# Patient Record
Sex: FEMALE | Race: WHITE | NOT HISPANIC OR LATINO | Employment: FULL TIME
[De-identification: names, ages, dates, MRNs, and addresses within clinical notes are randomized per-mention and may not be internally consistent; named-entity substitution may affect disease eponyms.]

---

## 2021-12-12 ENCOUNTER — HEALTH MAINTENANCE LETTER (OUTPATIENT)
Age: 26
End: 2021-12-12

## 2021-12-13 ASSESSMENT — ANXIETY QUESTIONNAIRES
2. NOT BEING ABLE TO STOP OR CONTROL WORRYING: NOT AT ALL
7. FEELING AFRAID AS IF SOMETHING AWFUL MIGHT HAPPEN: NOT AT ALL
3. WORRYING TOO MUCH ABOUT DIFFERENT THINGS: NOT AT ALL
1. FEELING NERVOUS, ANXIOUS, OR ON EDGE: SEVERAL DAYS
5. BEING SO RESTLESS THAT IT IS HARD TO SIT STILL: SEVERAL DAYS
7. FEELING AFRAID AS IF SOMETHING AWFUL MIGHT HAPPEN: NOT AT ALL
4. TROUBLE RELAXING: NOT AT ALL
6. BECOMING EASILY ANNOYED OR IRRITABLE: NOT AT ALL
GAD7 TOTAL SCORE: 2
GAD7 TOTAL SCORE: 2

## 2021-12-14 ENCOUNTER — OFFICE VISIT (OUTPATIENT)
Dept: OBGYN | Facility: CLINIC | Age: 26
End: 2021-12-14
Attending: MIDWIFE
Payer: COMMERCIAL

## 2021-12-14 VITALS
SYSTOLIC BLOOD PRESSURE: 121 MMHG | BODY MASS INDEX: 27.05 KG/M2 | HEART RATE: 72 BPM | WEIGHT: 178.5 LBS | DIASTOLIC BLOOD PRESSURE: 72 MMHG | HEIGHT: 68 IN

## 2021-12-14 DIAGNOSIS — Z12.4 SCREENING FOR MALIGNANT NEOPLASM OF CERVIX: ICD-10-CM

## 2021-12-14 DIAGNOSIS — Z00.00 VISIT FOR PREVENTIVE HEALTH EXAMINATION: Primary | ICD-10-CM

## 2021-12-14 DIAGNOSIS — Z01.419 ENCOUNTER FOR GYNECOLOGICAL EXAMINATION WITHOUT ABNORMAL FINDING: ICD-10-CM

## 2021-12-14 PROCEDURE — G0463 HOSPITAL OUTPT CLINIC VISIT: HCPCS | Mod: 25

## 2021-12-14 PROCEDURE — G0145 SCR C/V CYTO,THINLAYER,RESCR: HCPCS | Performed by: MIDWIFE

## 2021-12-14 PROCEDURE — G0008 ADMIN INFLUENZA VIRUS VAC: HCPCS

## 2021-12-14 PROCEDURE — 250N000011 HC RX IP 250 OP 636

## 2021-12-14 PROCEDURE — 99385 PREV VISIT NEW AGE 18-39: CPT | Performed by: MIDWIFE

## 2021-12-14 PROCEDURE — 90686 IIV4 VACC NO PRSV 0.5 ML IM: CPT

## 2021-12-14 RX ORDER — SPIRONOLACTONE 100 MG/1
TABLET, FILM COATED ORAL
COMMUNITY
Start: 2021-06-04

## 2021-12-14 RX ORDER — NORGESTIMATE AND ETHINYL ESTRADIOL 7DAYSX3 LO
1 KIT ORAL DAILY
Qty: 90 TABLET | Refills: 3 | Status: SHIPPED | OUTPATIENT
Start: 2021-12-14 | End: 2023-02-07

## 2021-12-14 ASSESSMENT — ANXIETY QUESTIONNAIRES
5. BEING SO RESTLESS THAT IT IS HARD TO SIT STILL: SEVERAL DAYS
3. WORRYING TOO MUCH ABOUT DIFFERENT THINGS: NOT AT ALL
6. BECOMING EASILY ANNOYED OR IRRITABLE: NOT AT ALL
2. NOT BEING ABLE TO STOP OR CONTROL WORRYING: NOT AT ALL
1. FEELING NERVOUS, ANXIOUS, OR ON EDGE: NOT AT ALL
GAD7 TOTAL SCORE: 1
7. FEELING AFRAID AS IF SOMETHING AWFUL MIGHT HAPPEN: NOT AT ALL

## 2021-12-14 ASSESSMENT — MIFFLIN-ST. JEOR: SCORE: 1603.17

## 2021-12-14 ASSESSMENT — PATIENT HEALTH QUESTIONNAIRE - PHQ9
SUM OF ALL RESPONSES TO PHQ QUESTIONS 1-9: 1
5. POOR APPETITE OR OVEREATING: NOT AT ALL

## 2021-12-14 NOTE — PATIENT INSTRUCTIONS

## 2021-12-14 NOTE — PROGRESS NOTES
Progress Note    SUBJECTIVE:  Terri Haines is an 26 year old, No obstetric history on file., who requests an Annual Preventive Exam.     Concerns today include: here for annual, refill OCP and pap screening    Family in [place]   Has lived in [place] x 6 yrs for school and now working,in [place] good support network here   Enjoys rock climbing, biking   Is covid Moderna vaccinated with booster.  Would like flu shot today      Menstrual History:  Menstrual History 12/14/2021 12/14/2021   LAST MENSTRUAL PERIOD 12/6/2021 -   Period Cycle (Days) - 28   Period Duration (Days) - 4-5   Method of Contraception - None   Period Pattern - Regular   Menstrual Flow - Moderate   Dysmenorrhea - Mild   PMS Symptoms - Cramping   Reviewed Today - Yes       Last  No results found for: PAP  History of abnormal Pap smear: NO - age 21-29 PAP every 3 years recommended    Last No results found for: HPV16  Last No results found for: HPV18  Last No results found for: HRHPV    Mammogram current: not applicable  Last Mammogram:   No results found.     Last Colonoscopy:  No results found for this or any previous visit.      HISTORY:  spironolactone (ALDACTONE) 100 MG tablet,     No current facility-administered medications on file prior to visit.    No Known Allergies  Immunization History   Administered Date(s) Administered     COVID-19,PF,Moderna 04/05/2021, 05/05/2021, 11/11/2021       OB History   No obstetric history on file.     History reviewed. No pertinent past medical history.  History reviewed. No pertinent surgical history.  History reviewed. No pertinent family history.  Social History     Socioeconomic History     Marital status: Single     Spouse name: None     Number of children: None     Years of education: None     Highest education level: None   Occupational History     None   Tobacco Use     Smoking status: Never Smoker     Smokeless tobacco: Never Used     Tobacco comment: n/a   Substance and Sexual Activity     Alcohol  "use: Yes     Comment: sometimes 1-2 drinks in a week, but usually less     Drug use: Not Currently     Types: Marijuana     Comment: I would occasionally smoke recreationally in college     Sexual activity: Not Currently     Partners: Male     Birth control/protection: None   Other Topics Concern     None   Social History Narrative    How much exercise per week? 3-4 times    How much calcium per day? Through foods       How much caffeine per day? One cup coffee    How much vitamin D per day? Through foods    Do you/your family wear seatbelts?  Yes    Do you/your family use safety helmets? Yes    Do you/your family use sunscreen? Yes    Do you/your family keep firearms in the home? No    Do you/your family have a smoke detector(s)? Yes        Reviewed by Shanthi Pickens 12-14-21         Social Determinants of Health     Financial Resource Strain: Not on file   Food Insecurity: Not on file   Transportation Needs: Not on file   Physical Activity: Not on file   Stress: Not on file   Social Connections: Not on file   Intimate Partner Violence: Not At Risk     Fear of Current or Ex-Partner: No     Emotionally Abused: No     Physically Abused: No     Sexually Abused: No   Housing Stability: Not on file       ROS  [unfilled]  PHQ-9 SCORE 12/14/2021   PHQ-9 Total Score 1     TONNY-7 SCORE 12/13/2021 12/14/2021   Total Score 2 (minimal anxiety) -   Total Score 2 1         EXAM:  Blood pressure 121/72, pulse 72, height 1.727 m (5' 8\"), weight 81 kg (178 lb 8 oz), last menstrual period 12/06/2021. Body mass index is 27.14 kg/m .  General - pleasant female in no acute distress.  Skin - no suspicious lesions or rashes  EENT-  PERRLA, euthyroid with out palpable nodules  Neck - supple without lymphadenopathy.  Lungs - clear to auscultation bilaterally.  Heart - regular rate and rhythm without murmur.  Abdomen - soft, nontender, nondistended, no masses or organomegaly noted.  Musculoskeletal - no gross deformities.  Neurological - " normal strength, sensation, and mental status.    Breast Exam:  Breast: Without visible skin changes. No dimpling or lesions seen.   Breasts supple, non-tender with palpation, no dominant mass, nodularity, or nipple discharge noted bilaterally. Axillary nodes negative.      Pelvic Exam:  EG/BUS: Normal genital architecture without lesions, erythema or abnormal secretions Bartholin's, Urethra, We[place]'s normal   Urethral meatus: normal   Urethra: no masses, tenderness, or scarring   Bladder: no masses or tenderness   Vagina: moist, pink, rugae with creamy, white and odorless  secretions  Cervix: Nulliparous,, no lesions and pink, moist, closed, without lesion or CMT  Uterus: anteverted,  and small, smooth, firm, mobile w/o pain  Adnexa: Within normal limits and No masses, nodularity, tenderness  Rectum:anus normal       ASSESSMENT:     (Z00.00) Visit for preventive health examination  (primary encounter diagnosis)  Plan: norgestim-eth estrad triphasic (ORTHO         TRI-CYCLEN LO) 0.18/0.215/0.25 MG-25 MCG         tablet, Pap Smear Exam [] Do Not Remove,         Pelvic and Breast Exam Procedure [], Pap         imaged thin layer screen reflex to HPV if ASCUS        - recommended age 25 - 29 years            (Z12.4) Screening for malignant neoplasm of cervix    Plan: Pap Smear Exam [] Do Not Remove, Pelvic         and Breast Exam Procedure [], Pap imaged         thin layer screen reflex to HPV if ASCUS -         recommended age 25 - 29 years           (Z01.419) Encounter for gynecological examination without abnormal finding  Plan: Pap Smear Exam [] Do Not Remove, Pelvic         and Breast Exam Procedure []          REFILL of OCP sent to her pharmact     Orders Placed This Encounter   Medications     spironolactone (ALDACTONE) 100 MG tablet       Additional teaching done at this visit regarding self breast exam.    Return to clinic in one year.  Follow-up as needed.        Answers for  HPI/ROS submitted by the patient on 12/13/2021  TONNY 7 TOTAL SCORE: 2  Alycia Reinisch CNM APRN

## 2021-12-14 NOTE — LETTER
12/14/2021       RE: Terri Haines  [address]  [facility] [address]     Dear Colleague,    Thank you for referring your patient, Terri Haines, to the [facility] [place] [facility]. Please see a copy of my visit note below.      Progress Note    SUBJECTIVE:  Terri Haines is an 26 year old, No obstetric history on file., who requests an Annual Preventive Exam.     Concerns today include: here for annual, refill OCP and pap screening    Family in [place]   Has lived in [place] 6 yrs for school and now working,in [place] good support network here   Enjoys rock climbing, biking   Is covid Moderna vaccinated with booster.  Would like flu shot today      Menstrual History:  Menstrual History 12/14/2021 12/14/2021   LAST MENSTRUAL PERIOD 12/6/2021 -   Period Cycle (Days) - 28   Period Duration (Days) - 4-5   Method of Contraception - None   Period Pattern - Regular   Menstrual Flow - Moderate   Dysmenorrhea - Mild   PMS Symptoms - Cramping   Reviewed Today - Yes       Last  No results found for: PAP  History of abnormal Pap smear: NO - age 21-29 PAP every 3 years recommended    Last No results found for: HPV16  Last No results found for: HPV18  Last No results found for: HRHPV    Mammogram current: not applicable  Last Mammogram:   No results found.     Last Colonoscopy:  No results found for this or any previous visit.      HISTORY:  spironolactone (ALDACTONE) 100 MG tablet,     No current facility-administered medications on file prior to visit.    No Known Allergies  Immunization History   Administered Date(s) Administered     COVID-19,PF,Moderna 04/05/2021, 05/05/2021, 11/11/2021       OB History   No obstetric history on file.     History reviewed. No pertinent past medical history.  History reviewed. No pertinent surgical history.  History reviewed. No pertinent family history.  Social History     Socioeconomic History      "Marital status: Single     Spouse name: None     Number of children: None     Years of education: None     Highest education level: None   Occupational History     None   Tobacco Use     Smoking status: Never Smoker     Smokeless tobacco: Never Used     Tobacco comment: n/a   Substance and Sexual Activity     Alcohol use: Yes     Comment: sometimes 1-2 drinks in a week, but usually less     Drug use: Not Currently     Types: Marijuana     Comment: I would occasionally smoke recreationally in college     Sexual activity: Not Currently     Partners: Male     Birth control/protection: None   Other Topics Concern     None   Social History Narrative    How much exercise per week? 3-4 times    How much calcium per day? Through foods       How much caffeine per day? One cup coffee    How much vitamin D per day? Through foods    Do you/your family wear seatbelts?  Yes    Do you/your family use safety helmets? Yes    Do you/your family use sunscreen? Yes    Do you/your family keep firearms in the home? No    Do you/your family have a smoke detector(s)? Yes        Reviewed by Shanthi Pickens 12-14-21         Social Determinants of Health     Financial Resource Strain: Not on file   Food Insecurity: Not on file   Transportation Needs: Not on file   Physical Activity: Not on file   Stress: Not on file   Social Connections: Not on file   Intimate Partner Violence: Not At Risk     Fear of Current or Ex-Partner: No     Emotionally Abused: No     Physically Abused: No     Sexually Abused: No   Housing Stability: Not on file       ROS  [unfilled]  PHQ-9 SCORE 12/14/2021   PHQ-9 Total Score 1     TONNY-7 SCORE 12/13/2021 12/14/2021   Total Score 2 (minimal anxiety) -   Total Score 2 1         EXAM:  Blood pressure 121/72, pulse 72, height 1.727 m (5' 8\"), weight 81 kg (178 lb 8 oz), last menstrual period 12/06/2021. Body mass index is 27.14 kg/m .  General - pleasant female in no acute distress.  Skin - no suspicious lesions or " rashes  EENT-  PERRLA, euthyroid with out palpable nodules  Neck - supple without lymphadenopathy.  Lungs - clear to auscultation bilaterally.  Heart - regular rate and rhythm without murmur.  Abdomen - soft, nontender, nondistended, no masses or organomegaly noted.  Musculoskeletal - no gross deformities.  Neurological - normal strength, sensation, and mental status.    Breast Exam:  Breast: Without visible skin changes. No dimpling or lesions seen.   Breasts supple, non-tender with palpation, no dominant mass, nodularity, or nipple discharge noted bilaterally. Axillary nodes negative.      Pelvic Exam:  EG/BUS: Normal genital architecture without lesions, erythema or abnormal secretions Bartholin's, Urethra, Delton's normal   Urethral meatus: normal   Urethra: no masses, tenderness, or scarring   Bladder: no masses or tenderness   Vagina: moist, pink, rugae with creamy, white and odorless  secretions  Cervix: Nulliparous,, no lesions and pink, moist, closed, without lesion or CMT  Uterus: anteverted,  and small, smooth, firm, mobile w/o pain  Adnexa: Within normal limits and No masses, nodularity, tenderness  Rectum:anus normal       ASSESSMENT:     (Z00.00) Visit for preventive health examination  (primary encounter diagnosis)  Plan: norgestim-eth estrad triphasic (ORTHO         TRI-CYCLEN LO) 0.18/0.215/0.25 MG-25 MCG         tablet, Pap Smear Exam [] Do Not Remove,         Pelvic and Breast Exam Procedure [], Pap         imaged thin layer screen reflex to HPV if ASCUS        - recommended age 25 - 29 years            (Z12.4) Screening for malignant neoplasm of cervix    Plan: Pap Smear Exam [] Do Not Remove, Pelvic         and Breast Exam Procedure [], Pap imaged         thin layer screen reflex to HPV if ASCUS -         recommended age 25 - 29 years           (Z01.419) Encounter for gynecological examination without abnormal finding  Plan: Pap Smear Exam [] Do Not Remove, Pelvic          and Breast Exam Procedure []          REFILL of OCP sent to her pharmact     Orders Placed This Encounter   Medications     spironolactone (ALDACTONE) 100 MG tablet       Additional teaching done at this visit regarding self breast exam.    Return to clinic in one year.  Follow-up as needed.        Answers for HPI/ROS submitted by the patient on 12/13/2021  TONNY 7 TOTAL SCORE: 2  Alycia HUI           Again, thank you for allowing me to participate in the care of your patient.      Sincerely,    KORINA Del Rosario CNM

## 2021-12-14 NOTE — LETTER
Date:December 15, 2021      Patient was self referred, no letter generated. Do not send.        [facility] Health Information

## 2021-12-16 LAB
BKR LAB AP GYN ADEQUACY: NORMAL
BKR LAB AP GYN INTERPRETATION: NORMAL
BKR LAB AP HPV REFLEX: NORMAL
BKR LAB AP LMP: NORMAL
BKR LAB AP PREVIOUS ABNORMAL: NORMAL
PATH REPORT.COMMENTS IMP SPEC: NORMAL
PATH REPORT.COMMENTS IMP SPEC: NORMAL
PATH REPORT.RELEVANT HX SPEC: NORMAL

## 2022-10-03 ENCOUNTER — HEALTH MAINTENANCE LETTER (OUTPATIENT)
Age: 27
End: 2022-10-03

## 2023-02-06 ASSESSMENT — ENCOUNTER SYMPTOMS
CHILLS: 0
ARTHRALGIAS: 0
HEADACHES: 0
NAUSEA: 0
PARESTHESIAS: 0
BREAST MASS: 0
DYSURIA: 0
JOINT SWELLING: 0
SORE THROAT: 0
FEVER: 0
CONSTIPATION: 0
WEAKNESS: 0
ABDOMINAL PAIN: 0
PALPITATIONS: 0
HEMATURIA: 0
FREQUENCY: 0
SHORTNESS OF BREATH: 0
DIZZINESS: 0
DIARRHEA: 0
EYE PAIN: 0
NERVOUS/ANXIOUS: 0
COUGH: 1
HEARTBURN: 0
MYALGIAS: 0
HEMATOCHEZIA: 0

## 2023-02-07 ENCOUNTER — OFFICE VISIT (OUTPATIENT)
Dept: FAMILY MEDICINE | Facility: CLINIC | Age: 28
End: 2023-02-07
Payer: COMMERCIAL

## 2023-02-07 ENCOUNTER — APPOINTMENT (OUTPATIENT)
Dept: LAB | Facility: CLINIC | Age: 28
End: 2023-02-07
Payer: COMMERCIAL

## 2023-02-07 VITALS
SYSTOLIC BLOOD PRESSURE: 117 MMHG | TEMPERATURE: 96.4 F | DIASTOLIC BLOOD PRESSURE: 77 MMHG | OXYGEN SATURATION: 98 % | WEIGHT: 181.5 LBS | BODY MASS INDEX: 27.51 KG/M2 | HEIGHT: 68 IN | HEART RATE: 80 BPM | RESPIRATION RATE: 15 BRPM

## 2023-02-07 DIAGNOSIS — Z00.00 ROUTINE HISTORY AND PHYSICAL EXAMINATION OF ADULT: Primary | ICD-10-CM

## 2023-02-07 DIAGNOSIS — L70.8 OTHER ACNE: ICD-10-CM

## 2023-02-07 LAB
ALBUMIN SERPL BCG-MCNC: 4.5 G/DL (ref 3.5–5.2)
ALP SERPL-CCNC: 63 U/L (ref 35–104)
ALT SERPL W P-5'-P-CCNC: 25 U/L (ref 10–35)
ANION GAP SERPL CALCULATED.3IONS-SCNC: 11 MMOL/L (ref 7–15)
AST SERPL W P-5'-P-CCNC: 24 U/L (ref 10–35)
BILIRUB SERPL-MCNC: 0.2 MG/DL
BUN SERPL-MCNC: 10.1 MG/DL (ref 6–20)
CALCIUM SERPL-MCNC: 9.2 MG/DL (ref 8.6–10)
CHLORIDE SERPL-SCNC: 102 MMOL/L (ref 98–107)
CREAT SERPL-MCNC: 0.69 MG/DL (ref 0.51–0.95)
DEPRECATED HCO3 PLAS-SCNC: 25 MMOL/L (ref 22–29)
GFR SERPL CREATININE-BSD FRML MDRD: >90 ML/MIN/1.73M2
GLUCOSE SERPL-MCNC: 115 MG/DL (ref 70–99)
POTASSIUM SERPL-SCNC: 4 MMOL/L (ref 3.4–5.3)
PROT SERPL-MCNC: 7.4 G/DL (ref 6.4–8.3)
SODIUM SERPL-SCNC: 138 MMOL/L (ref 136–145)

## 2023-02-07 PROCEDURE — 90715 TDAP VACCINE 7 YRS/> IM: CPT | Performed by: NURSE PRACTITIONER

## 2023-02-07 PROCEDURE — 80053 COMPREHEN METABOLIC PANEL: CPT | Performed by: NURSE PRACTITIONER

## 2023-02-07 PROCEDURE — 99385 PREV VISIT NEW AGE 18-39: CPT | Mod: 25 | Performed by: NURSE PRACTITIONER

## 2023-02-07 PROCEDURE — 36415 COLL VENOUS BLD VENIPUNCTURE: CPT | Performed by: NURSE PRACTITIONER

## 2023-02-07 PROCEDURE — 90471 IMMUNIZATION ADMIN: CPT | Performed by: NURSE PRACTITIONER

## 2023-02-07 ASSESSMENT — ENCOUNTER SYMPTOMS
WEAKNESS: 0
EYE PAIN: 0
CHILLS: 0
CONSTIPATION: 0
ABDOMINAL PAIN: 0
FEVER: 0
MYALGIAS: 0
ARTHRALGIAS: 0
HEARTBURN: 0
NAUSEA: 0
SORE THROAT: 0
PALPITATIONS: 0
JOINT SWELLING: 0
FREQUENCY: 0
DIZZINESS: 0
DYSURIA: 0
BREAST MASS: 0
SHORTNESS OF BREATH: 0
NERVOUS/ANXIOUS: 0
DIARRHEA: 0
COUGH: 1
HEMATURIA: 0
PARESTHESIAS: 0
HEMATOCHEZIA: 0
HEADACHES: 0

## 2023-02-07 ASSESSMENT — PAIN SCALES - GENERAL: PAINLEVEL: NO PAIN (0)

## 2023-02-07 NOTE — PROGRESS NOTES
SUBJECTIVE:   CC: Terri is an 27 year old who presents for preventive health visit.   Patient has been advised of split billing requirements and indicates understanding: Yes  Healthy Habits:     Getting at least 3 servings of Calcium per day:  Yes    Bi-annual eye exam:  NO    Dental care twice a year:  Yes    Sleep apnea or symptoms of sleep apnea:  Daytime drowsiness    Diet:  Regular (no restrictions)    Frequency of exercise:  2-3 days/week    Taking medications regularly:  Yes    Medication side effects:  Not applicable    PHQ-2 Total Score: 2    Additional concerns today:  No  Has been on spironolactone for acne-works well. Sees a dermatologist.  Mood has been stable; deals with some seasonal mood changes, but manageable with taking a daily walk.  Originally from [place], came to [place] for College at [employer].      Today's PHQ-2 Score:   PHQ-2 ( 1999 Pfizer) 2/6/2023   Q1: Little interest or pleasure in doing things 1   Q2: Feeling down, depressed or hopeless 1   PHQ-2 Score 2   Q1: Little interest or pleasure in doing things Several days   Q2: Feeling down, depressed or hopeless Several days   PHQ-2 Score 2       Have you ever done Advance Care Planning? (For example, a Health Directive, POLST, or a discussion with a medical provider or your loved ones about your wishes): No, advance care planning information given to patient to review.  Patient plans to discuss their wishes with loved ones or provider.      Social History     Tobacco Use     Smoking status: Never     Smokeless tobacco: Never     Tobacco comments:     n/a   Substance Use Topics     Alcohol use: Yes     Comment: sometimes 1-2 drinks in a week, but usually less     If you drink alcohol do you typically have >3 drinks per day or >7 drinks per week? No    No flowsheet data found.    Reviewed orders with patient.  Reviewed health maintenance and updated orders accordingly - Yes  Lab work is in process  Labs reviewed in EPIC    Breast  "Cancer Screening:    Breast CA Risk Assessment (FHS-7) 2/6/2023   Do you have a family history of breast, colon, or ovarian cancer? No / Unknown       click delete button to remove this line now  Patient under 40 years of age: Routine Mammogram Screening not recommended.   Pertinent mammograms are reviewed under the imaging tab.    History of abnormal Pap smear: NO - age 21-29 PAP every 3 years recommended  PAP / HPV Latest Ref Rng & Units 12/14/2021   PAP   Negative for Intraepithelial Lesion or Malignancy (NILM)     Reviewed and updated as needed this visit by clinical staff   Tobacco  Allergies  Meds   Med Hx  Surg Hx  Fam Hx  Soc Hx        Reviewed and updated as needed this visit by Provider   Tobacco     Med Hx  Surg Hx  Fam Hx  Soc Hx           Review of Systems   Constitutional: Negative for chills and fever.   HENT: Positive for congestion. Negative for ear pain, hearing loss and sore throat.    Eyes: Negative for pain and visual disturbance.   Respiratory: Positive for cough. Negative for shortness of breath.    Cardiovascular: Negative for chest pain, palpitations and peripheral edema.   Gastrointestinal: Negative for abdominal pain, constipation, diarrhea, heartburn, hematochezia and nausea.   Breasts:  Negative for tenderness, breast mass and discharge.   Genitourinary: Negative for dysuria, frequency, genital sores, hematuria, pelvic pain, urgency, vaginal bleeding and vaginal discharge.   Musculoskeletal: Negative for arthralgias, joint swelling and myalgias.   Skin: Negative for rash.   Neurological: Negative for dizziness, weakness, headaches and paresthesias.   Psychiatric/Behavioral: Negative for mood changes. The patient is not nervous/anxious.           OBJECTIVE:   /77 (BP Location: Right arm, Patient Position: Sitting, Cuff Size: Adult Regular)   Pulse 80   Temp (!) 96.4  F (35.8  C) (Temporal)   Resp 15   Ht 1.724 m (5' 7.87\")   Wt 82.3 kg (181 lb 8 oz)   LMP 01/10/2023 " "(Approximate)   SpO2 98%   BMI 27.70 kg/m    Physical Exam  GENERAL: healthy, alert and no distress  EYES: Eyes grossly normal to inspection, PERRL and conjunctivae and sclerae normal  NECK: no adenopathy, no asymmetry, masses, or scars and thyroid normal to palpation  RESP: lungs clear to auscultation - no rales, rhonchi or wheezes  CV: regular rate and rhythm, normal S1 S2, no S3 or S4, no murmur, click or rub, no peripheral edema and peripheral pulses strong  ABDOMEN: soft, nontender, no hepatosplenomegaly, no masses and bowel sounds normal  MS: no gross musculoskeletal defects noted, no edema  SKIN: no suspicious lesions or rashes    Diagnostic Test Results:  Labs reviewed in Epic    ASSESSMENT/PLAN:       ICD-10-CM    1. Routine history and physical examination of adult  Z00.00       2. Other acne  L70.8       stable; no concerns  Acne managed by dermatology        COUNSELING:  Reviewed preventive health counseling, as reflected in patient instructions       Regular exercise       Healthy diet/nutrition      BMI:   Estimated body mass index is 27.7 kg/m  as calculated from the following:    Height as of this encounter: 1.724 m (5' 7.87\").    Weight as of this encounter: 82.3 kg (181 lb 8 oz).         She reports that she has never smoked. She has never used smokeless tobacco.      KORINA Bentley CNP  [facility]  "

## 2023-09-11 ENCOUNTER — OFFICE VISIT (OUTPATIENT)
Dept: URGENT CARE | Facility: URGENT CARE | Age: 28
End: 2023-09-11
Payer: COMMERCIAL

## 2023-09-11 VITALS
OXYGEN SATURATION: 100 % | BODY MASS INDEX: 27.28 KG/M2 | DIASTOLIC BLOOD PRESSURE: 88 MMHG | WEIGHT: 180 LBS | HEART RATE: 66 BPM | TEMPERATURE: 97.3 F | RESPIRATION RATE: 15 BRPM | HEIGHT: 68 IN | SYSTOLIC BLOOD PRESSURE: 129 MMHG

## 2023-09-11 DIAGNOSIS — S91.012A LACERATION OF LEFT ANKLE, INITIAL ENCOUNTER: Primary | ICD-10-CM

## 2023-09-11 PROCEDURE — 99213 OFFICE O/P EST LOW 20 MIN: CPT | Performed by: PHYSICIAN ASSISTANT

## 2023-09-11 NOTE — PROGRESS NOTES
Laceration of left ankle, initial encounter  Laceration does not appear to be gaping or actively bleeding.  There is no tendon involvement.  The patient was offered reassurance as there does not seem to be any need for laceration repair today.  I reviewed wound care instructions with the patient and signs of infection and would like to have her return to clinic if the wound fails to heal in the next week or if signs of infection present.    Petey Olsen PA-C  [facility]    Subjective   28 year old who presents to clinic today for the following health issues:    Laceration       HPI     Patient visits today for concern of laceration to her left backside of her ankle earlier today from a screen door.  She was concerned that it might need stitches.  Patient has no loss of function of ankle and no pain with flexion or extension.  The laceration is not actively bleeding.    Review of Systems   Review of Systems   See HPI    Objective    Temp: 97.3  F (36.3  C) Temp src: Temporal BP: 129/88 Pulse: 66   Resp: 15 SpO2: 100 %       Physical Exam   Physical Exam  Constitutional:       General: She is not in acute distress.     Appearance: Normal appearance. She is normal weight. She is not ill-appearing, toxic-appearing or diaphoretic.   HENT:      Head: Normocephalic and atraumatic.   Cardiovascular:      Rate and Rhythm: Normal rate.      Pulses: Normal pulses.   Pulmonary:      Effort: Pulmonary effort is normal. No respiratory distress.   Musculoskeletal:        Feet:    Feet:      Comments: There is a small superficial laceration to the area shown above.  It is not actively bleeding and it does not appear to be gaping.  It does not extend to the Achilles tendon and the Achilles tendon seems to be fully functional.  De Los Santos test is negative.  Neurological:      General: No focal deficit present.      Mental Status: She is alert and oriented to person, place, and time. Mental status is at  baseline.      Gait: Gait normal.   Psychiatric:         Mood and Affect: Mood normal.         Behavior: Behavior normal.         Thought Content: Thought content normal.         Judgment: Judgment normal.          No results found for this or any previous visit (from the past 24 hour(s)).

## 2023-09-20 ENCOUNTER — OFFICE VISIT (OUTPATIENT)
Dept: URGENT CARE | Facility: URGENT CARE | Age: 28
End: 2023-09-20
Payer: COMMERCIAL

## 2023-09-20 VITALS
OXYGEN SATURATION: 98 % | WEIGHT: 180 LBS | DIASTOLIC BLOOD PRESSURE: 63 MMHG | SYSTOLIC BLOOD PRESSURE: 100 MMHG | RESPIRATION RATE: 18 BRPM | TEMPERATURE: 97.7 F | BODY MASS INDEX: 27.37 KG/M2 | HEART RATE: 64 BPM

## 2023-09-20 DIAGNOSIS — S91.012D LACERATION OF LEFT ANKLE, SUBSEQUENT ENCOUNTER: Primary | ICD-10-CM

## 2023-09-20 PROCEDURE — 99213 OFFICE O/P EST LOW 20 MIN: CPT | Performed by: NURSE PRACTITIONER

## 2023-09-20 NOTE — PROGRESS NOTES
Chief Complaint   Patient presents with    Urgent Care    Laceration     Follow up from cut back of Lt heel, not healing x10 days.      SUBJECTIVE:  Terri Haines is a 28 year old female who presents to the clinic today with a laceration of left posterior ankle that was obtained 9/11 from hitting it on a screen door. She is concerned that it hasn't healed all the way yet from when she was seen in  9/11. Denies fevers, nausea, vomiting, severe pain, malodorous discharge, red streaking.    Past Medical History:   Diagnosis Date    Known health problems: none      spironolactone (ALDACTONE) 100 MG tablet,     No current facility-administered medications on file prior to visit.    Social History     Tobacco Use    Smoking status: Never    Smokeless tobacco: Never    Tobacco comments:     n/a   Substance Use Topics    Alcohol use: Yes     Comment: sometimes 1-2 drinks in a week, but usually less     No Known Allergies    Review of Systems  All systems negative except for those listed above in HPI.      EXAM:   /63   Pulse 64   Temp 97.7  F (36.5  C) (Temporal)   Resp 18   Wt 81.6 kg (180 lb)   SpO2 98%   BMI 27.37 kg/m      Physical Exam  Vitals reviewed.   Constitutional:       Appearance: Normal appearance.   Musculoskeletal:      Right foot: Normal range of motion.      Left foot: Normal range of motion.   Skin:     General: Skin is warm and dry.      Findings: Laceration (Left posterior ankle laceration appears to be healing well, healthy granulation tissue. Appears wet, patient has mostly been keeping it covered) present.   Neurological:      Mental Status: She is alert.   Psychiatric:         Mood and Affect: Mood normal.         Behavior: Behavior normal.       ASSESSMENT:    ICD-10-CM    1. Laceration of left ankle, subsequent encounter  S91.012D         PLAN:  Laceration does not appear infected, antibiotics not indicated  Suturing contraindicated as injury was obtained 10 days ago, high risk  for infection.  Recommended epsom salt soaks, keeping open to air for at least 1 hr/day to promote oxidation and allow wound to scab to complete healing process.     Patient Instructions   Continue keeping wound clean with mild soap and water, apply bacitracin zinc ointment as needed.   Recommend epsom salt soaks daily for about 10 min.   Keep wound open to air for at least 1 hour per day to allow to oxidate and scab over.   Continue to monitor for signs of infection including fever, thick yellow/white discharge, a hard or soft lump under the skin or increasing pain, redness, warmth and/or swelling.      Follow up with primary care provider with any problems, questions or concerns or if symptoms worsen or fail to improve.       Milly Gonzalez, [facility] [place]

## 2023-09-20 NOTE — PATIENT INSTRUCTIONS
Continue keeping wound clean with mild soap and water, apply bacitracin zinc ointment as needed.   Recommend epsom salt soaks daily for about 10 min.   Keep wound open to air for at least 1 hour per day to allow to oxidate and scab over.   Continue to monitor for signs of infection including fever, thick yellow/white discharge, a hard or soft lump under the skin or increasing pain, redness, warmth and/or swelling.      Follow up with primary care provider with any problems, questions or concerns or if symptoms worsen or fail to improve.

## 2023-09-26 ENCOUNTER — IMMUNIZATION (OUTPATIENT)
Dept: FAMILY MEDICINE | Facility: CLINIC | Age: 28
End: 2023-09-26
Payer: COMMERCIAL

## 2023-09-26 DIAGNOSIS — Z23 ENCOUNTER FOR IMMUNIZATION: Primary | ICD-10-CM

## 2023-09-26 PROCEDURE — 90471 IMMUNIZATION ADMIN: CPT

## 2023-09-26 PROCEDURE — 99207 PR NO CHARGE NURSE ONLY: CPT

## 2023-09-26 PROCEDURE — 90686 IIV4 VACC NO PRSV 0.5 ML IM: CPT

## 2023-09-26 NOTE — PROGRESS NOTES
+------Prior to immunization administration, verified patients identity using patient s name and date of birth. Please see Immunization Activity for additional information.     Screening Questionnaire for Adult Immunization    Are you sick today?   No   Do you have allergies to medications, food, a vaccine component or latex?   No   Have you ever had a serious reaction after receiving a vaccination?   No   Do you have a long-term health problem with heart, lung, kidney, or metabolic disease (e.g., diabetes), asthma, a blood disorder, no spleen, complement component deficiency, a cochlear implant, or a spinal fluid leak?  Are you on long-term aspirin therapy?   No   Do you have cancer, leukemia, HIV/AIDS, or any other immune system problem?   No   Do you have a parent, brother, or sister with an immune system problem?   No   In the past 3 months, have you taken medications that affect  your immune system, such as prednisone, other steroids, or anticancer drugs; drugs for the treatment of rheumatoid arthritis, Crohn s disease, or psoriasis; or have you had radiation treatments?   No   Have you had a seizure, or a brain or other nervous system problem?   No   During the past year, have you received a transfusion of blood or blood    products, or been given immune (gamma) globulin or antiviral drug?   No   For women: Are you pregnant or is there a chance you could become       pregnant during the next month?   No   Have you received any vaccinations in the past 4 weeks?   No     Immunization questionnaire answers were all negative.    I have reviewed the following standing orders:   This patient is due and qualifies for the Influenza vaccine.    Click here for Influenza Vaccine Standing Order    I have reviewed the vaccines inclusion and exclusion criteria; No concerns regarding eligibility.     Patient instructed to remain in clinic for 15 minutes afterwards, and to report any adverse reactions.     Screening performed  by Taylor Aguilar MA on 9/26/2023 at 2:33 PM.

## 2024-03-09 ENCOUNTER — HEALTH MAINTENANCE LETTER (OUTPATIENT)
Age: 29
End: 2024-03-09

## 2024-03-30 SDOH — HEALTH STABILITY: PHYSICAL HEALTH: ON AVERAGE, HOW MANY MINUTES DO YOU ENGAGE IN EXERCISE AT THIS LEVEL?: 40 MIN

## 2024-03-30 SDOH — HEALTH STABILITY: PHYSICAL HEALTH: ON AVERAGE, HOW MANY DAYS PER WEEK DO YOU ENGAGE IN MODERATE TO STRENUOUS EXERCISE (LIKE A BRISK WALK)?: 7 DAYS

## 2024-03-30 ASSESSMENT — SOCIAL DETERMINANTS OF HEALTH (SDOH): HOW OFTEN DO YOU GET TOGETHER WITH FRIENDS OR RELATIVES?: THREE TIMES A WEEK

## 2024-03-30 NOTE — COMMUNITY RESOURCES LIST (ENGLISH)
March 30, 2024           YOUR PERSONALIZED LIST OF SERVICES & PROGRAMS           & SHELTER    Case Management      Living - Housing Stabilization Services  [address] [place] [address] (Distance: 2.4 miles)  Phone: [phone]  Website: [email or web address].In.Ranku  Language: Greenlandic, English, Hong Konger  Fee: Insurance, Self pay      [facility] Housing Stabilization Services (HSS)  Phone: [phone]  Website: https://www.Celeno.net/resources  Language: English, Barbadian  Hours: Mon 8:00 AM - 4:00 PM Tue 8:00 AM - 4:00 PM Wed 8:00 AM - 4:00 PM Thu 8:00 AM - 4:00 PM Fri 8:00 AM - 4:00 PM  Fee: Free, Insurance  Accessibility: Ada accessible, Blind accommodation, Deaf or hard of hearing, Translation services      [facility] - Housing Stabilization Services  Phone: [phone]  Website: https://homebasemn.com/  Language: English  Hours: Mon 8:00 AM - 4:00 PM Tue 8:00 AM - 4:00 PM Wed 8:00 AM - 4:00 PM Thu 8:00 AM - 4:00 PM Fri 8:00 AM - 4:00 PM  Fee: Free  Accessibility: Blind accommodation, Deaf or hard of hearing  Transportation Options: Free transportation    Payment Assistance      [facility] - Rent and mortgage payment assistance [facility]  [address] [place] [address] (Distance: 1.4 miles)  Phone: [phone]  Language: English, Barbadian  Fee: Free  Accessibility: Ada accessible, Translation services      [employer] - Emergency Rental Assistance (ERA)  [address] [place] [address] (Distance: 2.1 miles)  Phone: [phone] [phone]  Email: mely@XMLAW.org  Website: https://www.XMLAW.org/era  Language: English  Fee: Free      [employer] - Keep the Key  Phone: ([phone] 770-4152  Website: https://www.bxv62-lrmwopdpdqqouw.org/programs.html  Language: English  Hours: Mon 7:00 AM - 7:00 PM Tue 7:00 AM - 7:00 PM Wed 7:00 AM - 7:00 PM Thu 7:00 AM - 7:00 PM Fri 7:00  AM - 7:00 PM  Fee: Free    Mediation & Eviction Prevention      Living - Housing Stabilization Services  [address] [place] [address] (Distance: 2.4 miles)  Phone: [phone]  Website: https://5app.ProtoExchange  Language: Persian, English, Kosovan  Fee: Insurance, Self pay      [employer] - Foreclosure Prevention  [address] [place] [address] (Distance: 5.0 miles)  Phone: [phone]  Website: http://www.NetPosa Technologies  Language: English, Occitan, Hmong  Fee: Free, Sliding scale  Accessibility: Blind accommodation, Ada accessible      Line - Tenant Rights / Eviction Prevention  Website: https://Geosophic.org/f-vjji-sz-/  Language: English, Occitan               IMPORTANT NUMBERS & WEBSITES        Emergency Services  911  .   [facility]  211 http://211unitedway.org  .   Poison Control  (451) 737-1792 http://[place]poison.org http://wisconsinpoison.org  .     Suicide and Crisis Lifeline  988 http://988lifeline.org  .   [facility] [place] Child Abuse Hotline  677.245.5197 http://Childhelphotline.org   .   National Sexual Assault Hotline  (482) 181-8953 (HOPE) http://GOVECSn.org   .     National Runaway Safeline  (868) 947-5638 (RUNAWAY) http://SweetSlapruRescueTime.org  .   Pregnancy & Postpartum Support  Call/text 658-102-3530  [place]: http://ppsupportmn.org  [place]: http://Foodzie.com/wi  .   Substance Abuse National Helpline ([facility])  637-594-HELP (8854) http://Findtreatment.gov   .                DISCLAIMER: [employer] does not endorse any service providers mentioned in this resource list. [employer] does not guarantee that the services mentioned in this resource list will be available to you or will improve your health or wellness.    [facility]

## 2024-04-04 ENCOUNTER — OFFICE VISIT (OUTPATIENT)
Dept: FAMILY MEDICINE | Facility: CLINIC | Age: 29
End: 2024-04-04
Payer: COMMERCIAL

## 2024-04-04 VITALS
HEART RATE: 83 BPM | RESPIRATION RATE: 16 BRPM | DIASTOLIC BLOOD PRESSURE: 69 MMHG | BODY MASS INDEX: 28.76 KG/M2 | TEMPERATURE: 98.2 F | WEIGHT: 189.8 LBS | OXYGEN SATURATION: 99 % | SYSTOLIC BLOOD PRESSURE: 108 MMHG | HEIGHT: 68 IN

## 2024-04-04 DIAGNOSIS — Z11.4 SCREENING FOR HIV (HUMAN IMMUNODEFICIENCY VIRUS): ICD-10-CM

## 2024-04-04 DIAGNOSIS — Z30.011 ENCOUNTER FOR INITIAL PRESCRIPTION OF CONTRACEPTIVE PILLS: ICD-10-CM

## 2024-04-04 DIAGNOSIS — Z11.59 NEED FOR HEPATITIS C SCREENING TEST: ICD-10-CM

## 2024-04-04 DIAGNOSIS — Z13.220 LIPID SCREENING: ICD-10-CM

## 2024-04-04 DIAGNOSIS — Z13.1 SCREENING FOR DIABETES MELLITUS: ICD-10-CM

## 2024-04-04 DIAGNOSIS — Z00.00 ROUTINE GENERAL MEDICAL EXAMINATION AT A HEALTH CARE FACILITY: ICD-10-CM

## 2024-04-04 LAB
CHOLEST SERPL-MCNC: 186 MG/DL
FASTING STATUS PATIENT QL REPORTED: ABNORMAL
HBA1C MFR BLD: 5.5 % (ref 0–5.6)
HCG UR QL: NEGATIVE
HCV AB SERPL QL IA: NONREACTIVE
HDLC SERPL-MCNC: 53 MG/DL
HIV 1+2 AB+HIV1 P24 AG SERPL QL IA: NONREACTIVE
LDLC SERPL CALC-MCNC: 100 MG/DL
NONHDLC SERPL-MCNC: 133 MG/DL
TRIGL SERPL-MCNC: 163 MG/DL

## 2024-04-04 PROCEDURE — 36415 COLL VENOUS BLD VENIPUNCTURE: CPT | Performed by: FAMILY MEDICINE

## 2024-04-04 PROCEDURE — 87389 HIV-1 AG W/HIV-1&-2 AB AG IA: CPT | Performed by: FAMILY MEDICINE

## 2024-04-04 PROCEDURE — 83036 HEMOGLOBIN GLYCOSYLATED A1C: CPT | Performed by: FAMILY MEDICINE

## 2024-04-04 PROCEDURE — 80061 LIPID PANEL: CPT | Performed by: FAMILY MEDICINE

## 2024-04-04 PROCEDURE — 86803 HEPATITIS C AB TEST: CPT | Performed by: FAMILY MEDICINE

## 2024-04-04 PROCEDURE — 99395 PREV VISIT EST AGE 18-39: CPT | Performed by: FAMILY MEDICINE

## 2024-04-04 PROCEDURE — 81025 URINE PREGNANCY TEST: CPT | Performed by: FAMILY MEDICINE

## 2024-04-04 RX ORDER — NORGESTIMATE AND ETHINYL ESTRADIOL 7DAYSX3 LO
1 KIT ORAL DAILY
Qty: 90 TABLET | Refills: 3 | Status: SHIPPED | OUTPATIENT
Start: 2024-04-04

## 2024-04-04 ASSESSMENT — PAIN SCALES - GENERAL: PAINLEVEL: NO PAIN (0)

## 2024-04-04 NOTE — PATIENT INSTRUCTIONS
Preventive Care Advice   This is general advice given by our system to help you stay healthy. However, your care team may have specific advice just for you. Please talk to your care team about your preventive care needs.  Nutrition  Eat 5 or more servings of fruits and vegetables each day.  Try wheat bread, brown rice and whole grain pasta (instead of white bread, rice, and pasta).  Get enough calcium and vitamin D. Check the label on foods and aim for 100% of the RDA (recommended daily allowance).  Lifestyle  Exercise at least 150 minutes each week   (30 minutes a day, 5 days a week).  Do muscle strengthening activities 2 days a week. These help control your weight and prevent disease.  No smoking.  Wear sunscreen to prevent skin cancer.  Have a dental exam and cleaning every 6 months.  Yearly exams  See your health care team every year to talk about:  Any changes in your health.  Any medicines your care team has prescribed.  Preventive care, family planning, and ways to prevent chronic diseases.  Shots (vaccines)   HPV shots (up to age 26), if you've never had them before.  Hepatitis B shots (up to age 59), if you've never had them before.  COVID-19 shot: Get this shot when it's due.  Flu shot: Get a flu shot every year.  Tetanus shot: Get a tetanus shot every 10 years.  Pneumococcal, hepatitis A, and RSV shots: Ask your care team if you need these based on your risk.  Shingles shot (for age 50 and up).  General health tests  Diabetes screening:  Starting at age 35, Get screened for diabetes at least every 3 years.  If you are younger than age 35, ask your care team if you should be screened for diabetes.  Cholesterol test: At age 39, start having a cholesterol test every 5 years, or more often if advised.  Bone density scan (DEXA): At age 50, ask your care team if you should have this scan for osteoporosis (brittle bones).  Hepatitis C: Get tested at least once in your life.  STIs (sexually transmitted  infections)  Before age 24: Ask your care team if you should be screened for STIs.  After age 24: Get screened for STIs if you're at risk. You are at risk for STIs (including HIV) if:  You are sexually active with more than one person.  You don't use condoms every time.  You or a partner was diagnosed with a sexually transmitted infection.  If you are at risk for HIV, ask about PrEP medicine to prevent HIV.  Get tested for HIV at least once in your life, whether you are at risk for HIV or not.  Cancer screening tests  Cervical cancer screening: If you have a cervix, begin getting regular cervical cancer screening tests at age 21. Most people who have regular screenings with normal results can stop after age 65. Talk about this with your provider.  Breast cancer scan (mammogram): If you've ever had breasts, begin having regular mammograms starting at age 40. This is a scan to check for breast cancer.  Colon cancer screening: It is important to start screening for colon cancer at age 45.  Have a colonoscopy test every 10 years (or more often if you're at risk) Or, ask your provider about stool tests like a FIT test every year or Cologuard test every 3 years.  To learn more about your testing options, visit: https://www.[facility]/880563.pdf.  For help making a decision, visit: https://bit.ly/ja42258.  Prostate cancer screening test: If you have a prostate and are age 55 to 69, ask your provider if you would benefit from a yearly prostate cancer screening test.  Lung cancer screening: If you are a current or former smoker age 50 to 80, ask your care team if ongoing lung cancer screenings are right for you.  For informational purposes only. Not to replace the advice of your health care provider. Copyright   2023 [employer]. All rights reserved. Clinically reviewed by the [facility] Transitions Program. Cityblis 040261 - REV 01/24.    Learning About Stress  What is stress?     Stress is your  body's response to a hard situation. Your body can have a physical, emotional, or mental response. Stress is a fact of life for most people, and it affects everyone differently. What causes stress for you may not be stressful for someone else.  A lot of things can cause stress. You may feel stress when you go on a job interview, take a test, or run a race. This kind of short-term stress is normal and even useful. It can help you if you need to work hard or react quickly. For example, stress can help you finish an important job on time.  Long-term stress is caused by ongoing stressful situations or events. Examples of long-term stress include long-term health problems, ongoing problems at work, or conflicts in your family. Long-term stress can harm your health.  How does stress affect your health?  When you are stressed, your body responds as though you are in danger. It makes hormones that speed up your heart, make you breathe faster, and give you a burst of energy. This is called the fight-or-flight stress response. If the stress is over quickly, your body goes back to normal and no harm is done.  But if stress happens too often or lasts too long, it can have bad effects. Long-term stress can make you more likely to get sick, and it can make symptoms of some diseases worse. If you tense up when you are stressed, you may develop neck, shoulder, or low back pain. Stress is linked to high blood pressure and heart disease.  Stress also harms your emotional health. It can make you bedoya, tense, or depressed. Your relationships may suffer, and you may not do well at work or school.  What can you do to manage stress?  You can try these things to help manage stress:   Do something active. Exercise or activity can help reduce stress. Walking is a great way to get started. Even everyday activities such as housecleaning or yard work can help.  Try yoga or jimmie chi. These techniques combine exercise and meditation. You may need  some training at first to learn them.  Do something you enjoy. For example, listen to music or go to a movie. Practice your hobby or do volunteer work.  Meditate. This can help you relax, because you are not worrying about what happened before or what may happen in the future.  Do guided imagery. Imagine yourself in any setting that helps you feel calm. You can use online videos, books, or a teacher to guide you.  Do breathing exercises. For example:  From a standing position, bend forward from the waist with your knees slightly bent. Let your arms dangle close to the floor.  Breathe in slowly and deeply as you return to a standing position. Roll up slowly and lift your head last.  Hold your breath for just a few seconds in the standing position.  Breathe out slowly and bend forward from the waist.  Let your feelings out. Talk, laugh, cry, and express anger when you need to. Talking with supportive friends or family, a counselor, or a zeke leader about your feelings is a healthy way to relieve stress. Avoid discussing your feelings with people who make you feel worse.  Write. It may help to write about things that are bothering you. This helps you find out how much stress you feel and what is causing it. When you know this, you can find better ways to cope.  What can you do to prevent stress?  You might try some of these things to help prevent stress:  Manage your time. This helps you find time to do the things you want and need to do.  Get enough sleep. Your body recovers from the stresses of the day while you are sleeping.  Get support. Your family, friends, and community can make a difference in how you experience stress.  Limit your news feed. Avoid or limit time on social media or news that may make you feel stressed.  Do something active. Exercise or activity can help reduce stress. Walking is a great way to get started.  Where can you learn more?  Go to https://www.healthwise.net/patiented  Enter N032 in the  "search box to learn more about \"Learning About Stress.\"  Current as of: October 24, 2023               Content Version: 14.0    5916-9348 [employer].   Care instructions adapted under license by your healthcare professional. If you have questions about a medical condition or this instruction, always ask your healthcare professional. [employer] disclaims any warranty or liability for your use of this information.      Substance Use Disorder: Care Instructions  Overview     You can improve your life and health by stopping your use of alcohol or drugs. When you don't drink or use drugs, you may feel and sleep better. You may get along better with your family, friends, and coworkers. There are medicines and programs that can help with substance use disorder.  How can you care for yourself at home?  Here are some ways to help you stay sober and prevent relapse.  If you have been given medicine to help keep you sober or reduce your cravings, be sure to take it exactly as prescribed.  Talk to your doctor about programs that can help you stop using drugs or drinking alcohol.  Do not keep alcohol or drugs in your home.  Plan ahead. Think about what you'll say if other people ask you to drink or use drugs. Try not to spend time with people who drink or use drugs.  Use the time and money spent on drinking or drugs to do something that's important to you.  Preventing a relapse  Have a plan to deal with relapse. Learn to recognize changes in your thinking that lead you to drink or use drugs. Get help before you start to drink or use drugs again.  Try to stay away from situations, friends, or places that may lead you to drink or use drugs.  If you feel the need to drink alcohol or use drugs again, seek help right away. Call a trusted friend or family member. Some people get support from organizations such as Narcotics Anonymous or [facility] or from treatment facilities.  If you relapse, get help " as soon as you can. Some people make a plan with another person that outlines what they want that person to do for them if they relapse. The plan usually includes how to handle the relapse and who to notify in case of relapse.  Don't give up. Remember that a relapse doesn't mean that you have failed. Use the experience to learn the triggers that lead you to drink or use drugs. Then quit again. Recovery is a lifelong process. Many people have several relapses before they are able to quit for good.  Follow-up care is a key part of your treatment and safety. Be sure to make and go to all appointments, and call your doctor if you are having problems. It's also a good idea to know your test results and keep a list of the medicines you take.  When should you call for help?   Call 911  anytime you think you may need emergency care. For example, call if you or someone else:    Has overdosed or has withdrawal signs. Be sure to tell the emergency workers that you are or someone else is using or trying to quit using drugs. Overdose or withdrawal signs may include:  Losing consciousness.  Seizure.  Seeing or hearing things that aren't there (hallucinations).     Is thinking or talking about suicide or harming others.   Where to get help 24 hours a day, 7 days a week   If you or someone you know talks about suicide, self-harm, a mental health crisis, a substance use crisis, or any other kind of emotional distress, get help right away. You can:    Call the Suicide and Crisis Lifeline at 980.     Call 5-203-442-TALK (1-272.690.3152).     Text HOME to 680859 to access the Crisis Text Line.   Consider saving these numbers in your phone.  Go to [employer] Out[employer] Clothingline.org for more information or to chat online.  Call your doctor now or seek immediate medical care if:    You are having withdrawal symptoms. These may include nausea or vomiting, sweating, shakiness, and anxiety.   Watch closely for changes in your health, and be sure to contact  "your doctor if:    You have a relapse.     You need more help or support to stop.   Where can you learn more?  Go to https://www.healthMyer.net/patiented  Enter H573 in the search box to learn more about \"Substance Use Disorder: Care Instructions.\"  Current as of: November 15, 2023               Content Version: 14.0    4995-5466 [employer].   Care instructions adapted under license by your healthcare professional. If you have questions about a medical condition or this instruction, always ask your healthcare professional. Healthwise, [employer] disclaims any warranty or liability for your use of this information.      "

## 2024-04-04 NOTE — PROGRESS NOTES
"Preventive Care Visit  [facility]  Yamilet Stewart MD, Family Medicine  Apr 4, 2024      Assessment & Plan     Routine general medical examination at a health care facility  - per pt up to date with hepatitis B vaccine     Screening for HIV (human immunodeficiency virus)  - HIV Antigen Antibody Combo; Future  - HIV Antigen Antibody Combo    Need for hepatitis C screening test  - Hepatitis C Screen Reflex to HCV RNA Quant and Genotype; Future  - Hepatitis C Screen Reflex to HCV RNA Quant and Genotype    Lipid screening  - Lipid panel reflex to direct LDL Non-fasting; Future  - Lipid panel reflex to direct LDL Non-fasting    Screening for diabetes mellitus  - Hemoglobin A1c; Future  - Hemoglobin A1c    Encounter for initial prescription of contraceptive pills  - no contraindications   - HCG qualitative urine; Future  - norgestim-eth estrad triphasic (ORTHO TRI-CYCLEN LO) 0.18/0.215/0.25 MG-25 MCG tablet; Take 1 tablet by mouth daily  - HCG qualitative urine              BMI  Estimated body mass index is 28.77 kg/m  as calculated from the following:    Height as of this encounter: 1.73 m (5' 8.11\").    Weight as of this encounter: 86.1 kg (189 lb 12.8 oz).       Counseling  Appropriate preventive services were discussed with this patient, including applicable screening as appropriate for fall prevention, nutrition, physical activity, Tobacco-use cessation, weight loss and cognition.  Checklist reviewing preventive services available has been given to the patient.  Reviewed patient's diet, addressing concerns and/or questions.   She is at risk for psychosocial distress and has been provided with information to reduce risk.           Joaquim Murray is a 28 year old, presenting for the following:  Physical and Annual Visit        4/4/2024    10:32 AM   Additional Questions   Roomed by farheen mcgill   Accompanied by self        Health Care Directive  Patient does not have a Health Care " Directive or Living Will: Discussed advance care planning with patient; information given to patient to review.    HPI            3/30/2024   General Health   How would you rate your overall physical health? Good   Feel stress (tense, anxious, or unable to sleep) Only a little   (!) STRESS CONCERN      3/30/2024   Nutrition   Three or more servings of calcium each day? Yes   Diet: Regular (no restrictions)   How many servings of fruit and vegetables per day? (!) 2-3   How many sweetened beverages each day? 0-1         3/30/2024   Exercise   Days per week of moderate/strenous exercise 7 days   Average minutes spent exercising at this level 40 min         3/30/2024   Social Factors   Frequency of gathering with friends or relatives Three times a week   Worry food won't last until get money to buy more No   Food not last or not have enough money for food? No   Do you have housing?  Yes   Are you worried about losing your housing? Yes   Lack of transportation? No   Unable to get utilities (heat,electricity)? No   Want help with housing or utility concern? No   (!) HOUSING CONCERN PRESENT      3/30/2024   Dental   Dentist two times every year? Yes         3/30/2024   TB Screening   Were you born outside of the [place]? No         Today's PHQ-2 Score:       4/4/2024    10:26 AM   PHQ-2 ( 1999 [employer])   Q1: Little interest or pleasure in doing things 0   Q2: Feeling down, depressed or hopeless 0   PHQ-2 Score 0   Q1: Little interest or pleasure in doing things Not at all   Q2: Feeling down, depressed or hopeless Not at all   PHQ-2 Score 0           3/30/2024   Substance Use   Alcohol more than 3/day or more than 7/wk No   Do you use any other substances recreationally? (!) CANNABIS PRODUCTS     Social History     Tobacco Use    Smoking status: Never    Smokeless tobacco: Never    Tobacco comments:     n/a   Vaping Use    Vaping Use: Never used   Substance Use Topics    Alcohol use: Yes     Comment: sometimes 1-2 drinks in a  "week, but usually less    Drug use: Not Currently     Types: Marijuana     Comment: I would occasionally smoke recreationally in college             3/30/2024   Breast Cancer Screening   Family history of breast, colon, or ovarian cancer? No / Unknown                3/30/2024   One time HIV Screening   Previous HIV test? No         3/30/2024   STI Screening   New sexual partner(s) since last STI/HIV test? No     History of abnormal Pap smear: NO - age 21-29 PAP every 3 years recommended        12/14/2021    10:05 AM   PAP / HPV   PAP Negative for Intraepithelial Lesion or Malignancy (NILM)            3/30/2024   Contraception/Family Planning   Questions about contraception or family planning (!) YES, wants to restart birth control. Menstrual cycle two weeks ago. Not currently sexually active. No history of migraines, DVT/PE or smoking.         Reviewed and updated as needed this visit by Provider                             Objective    Exam  LMP 03/20/2024 (Approximate)    Estimated body mass index is 27.37 kg/m  as calculated from the following:    Height as of 9/11/23: 1.727 m (5' 8\").    Weight as of 9/20/23: 81.6 kg (180 lb).    Physical Exam          Signed Electronically by: Yamilet Stewart MD    "

## 2024-09-10 ENCOUNTER — TRANSFERRED RECORDS (OUTPATIENT)
Dept: HEALTH INFORMATION MANAGEMENT | Facility: CLINIC | Age: 29
End: 2024-09-10
Payer: COMMERCIAL

## 2024-11-04 ASSESSMENT — ANXIETY QUESTIONNAIRES: GAD7 TOTAL SCORE: 1

## 2025-03-06 DIAGNOSIS — Z30.011 ENCOUNTER FOR INITIAL PRESCRIPTION OF CONTRACEPTIVE PILLS: ICD-10-CM

## 2025-03-06 RX ORDER — NORGESTIMATE AND ETHINYL ESTRADIOL
1 KIT DAILY
Qty: 84 TABLET | Refills: 3 | Status: SHIPPED | OUTPATIENT
Start: 2025-03-06

## 2025-04-16 SDOH — HEALTH STABILITY: PHYSICAL HEALTH: ON AVERAGE, HOW MANY MINUTES DO YOU ENGAGE IN EXERCISE AT THIS LEVEL?: 60 MIN

## 2025-04-16 SDOH — HEALTH STABILITY: PHYSICAL HEALTH: ON AVERAGE, HOW MANY DAYS PER WEEK DO YOU ENGAGE IN MODERATE TO STRENUOUS EXERCISE (LIKE A BRISK WALK)?: 4 DAYS

## 2025-04-16 ASSESSMENT — SOCIAL DETERMINANTS OF HEALTH (SDOH): HOW OFTEN DO YOU GET TOGETHER WITH FRIENDS OR RELATIVES?: THREE TIMES A WEEK

## 2025-04-17 ENCOUNTER — OFFICE VISIT (OUTPATIENT)
Dept: FAMILY MEDICINE | Facility: CLINIC | Age: 30
End: 2025-04-17
Payer: COMMERCIAL

## 2025-04-17 VITALS
SYSTOLIC BLOOD PRESSURE: 111 MMHG | BODY MASS INDEX: 27.74 KG/M2 | WEIGHT: 183 LBS | TEMPERATURE: 97.7 F | HEIGHT: 68 IN | RESPIRATION RATE: 10 BRPM | HEART RATE: 58 BPM | OXYGEN SATURATION: 99 % | DIASTOLIC BLOOD PRESSURE: 71 MMHG

## 2025-04-17 DIAGNOSIS — L70.8 OTHER ACNE: ICD-10-CM

## 2025-04-17 DIAGNOSIS — Z12.4 CERVICAL CANCER SCREENING: ICD-10-CM

## 2025-04-17 DIAGNOSIS — Z11.3 SCREENING EXAMINATION FOR VENEREAL DISEASE: ICD-10-CM

## 2025-04-17 DIAGNOSIS — Z00.00 ROUTINE HISTORY AND PHYSICAL EXAMINATION OF ADULT: Primary | ICD-10-CM

## 2025-04-17 LAB
CLUE CELLS: ABNORMAL
TRICHOMONAS, WET PREP: ABNORMAL
WBC'S/HIGH POWER FIELD, WET PREP: ABNORMAL
YEAST, WET PREP: PRESENT

## 2025-04-17 PROCEDURE — 86780 TREPONEMA PALLIDUM: CPT | Performed by: NURSE PRACTITIONER

## 2025-04-17 PROCEDURE — G2211 COMPLEX E/M VISIT ADD ON: HCPCS | Performed by: NURSE PRACTITIONER

## 2025-04-17 PROCEDURE — 87389 HIV-1 AG W/HIV-1&-2 AB AG IA: CPT | Performed by: NURSE PRACTITIONER

## 2025-04-17 PROCEDURE — 87491 CHLMYD TRACH DNA AMP PROBE: CPT | Performed by: NURSE PRACTITIONER

## 2025-04-17 PROCEDURE — 87591 N.GONORRHOEAE DNA AMP PROB: CPT | Performed by: NURSE PRACTITIONER

## 2025-04-17 PROCEDURE — 80053 COMPREHEN METABOLIC PANEL: CPT | Performed by: NURSE PRACTITIONER

## 2025-04-17 PROCEDURE — 99213 OFFICE O/P EST LOW 20 MIN: CPT | Mod: 25 | Performed by: NURSE PRACTITIONER

## 2025-04-17 PROCEDURE — 99395 PREV VISIT EST AGE 18-39: CPT | Performed by: NURSE PRACTITIONER

## 2025-04-17 PROCEDURE — 36415 COLL VENOUS BLD VENIPUNCTURE: CPT | Performed by: NURSE PRACTITIONER

## 2025-04-17 PROCEDURE — G0145 SCR C/V CYTO,THINLAYER,RESCR: HCPCS | Performed by: NURSE PRACTITIONER

## 2025-04-17 PROCEDURE — 87210 SMEAR WET MOUNT SALINE/INK: CPT | Performed by: NURSE PRACTITIONER

## 2025-04-17 ASSESSMENT — PAIN SCALES - GENERAL: PAINLEVEL_OUTOF10: NO PAIN (0)

## 2025-04-17 NOTE — PROGRESS NOTES
Preventive Care Visit  [facility]  KORINA Bentley CNP, Family Medicine  Apr 17, 2025  {Provider  Link to [facility] :[id number]}    ICD-10-CM    1. Routine history and physical examination of adult  Z00.00       2. Cervical cancer screening  Z12.4 Pap Screen Reflex to HPV if ASCUS - Recommended Age 25 - 29 Years      3. Screening examination for venereal disease  Z11.3 Vaginal-Chlamydia trachomatis/Neisseria gonorrhoeae by PCR     HIV Antigen Antibody Combo Cascade     Treponema Abs w Reflex to RPR and Titer     Vagina-Wet preparation     Vaginal-Chlamydia trachomatis/Neisseria gonorrhoeae by PCR     HIV Antigen Antibody Combo Cascade     Treponema Abs w Reflex to RPR and Titer     Vagina-Wet preparation      4. Other acne  L70.8 Comprehensive metabolic panel (BMP + Alb, Alk Phos, ALT, AST, Total. Bili, TP)     Comprehensive metabolic panel (BMP + Alb, Alk Phos, ALT, AST, Total. Bili, TP)      Chronic conditions stable, no concerns      Subjective   Terri is a 29 year old, presenting for the following:  Physical        4/17/2025     3:26 PM   Additional Questions   Roomed by Marsha Rockwell          HPI          4/16/2025   General Health   How would you rate your overall physical health? Good   Feel stress (tense, anxious, or unable to sleep) To some extent   (!) STRESS CONCERN      4/16/2025   Nutrition   Three or more servings of calcium each day? Yes   Diet: Regular (no restrictions)   How many servings of fruit and vegetables per day? (!) 2-3   How many sweetened beverages each day? 0-1         4/16/2025   Exercise   Days per week of moderate/strenous exercise 4 days   Average minutes spent exercising at this level 60 min         4/16/2025   Social Factors   Frequency of gathering with friends or relatives Three times a week   Worry food won't last until get money to buy more No   Food not last or not have enough money for food? No   Do you have housing? (Housing is  defined as stable permanent housing and does not include staying ouside in a car, in a tent, in an abandoned building, in an overnight shelter, or couch-surfing.) Yes   Are you worried about losing your housing? No   Lack of transportation? No   Unable to get utilities (heat,electricity)? No         4/16/2025   Dental   Dentist two times every year? Yes           4/16/2025   Substance Use   Alcohol more than 3/day or more than 7/wk No   Do you use any other substances recreationally? (!) CANNABIS PRODUCTS     Social History     Tobacco Use    Smoking status: Never    Smokeless tobacco: Never    Tobacco comments:     n/a   Vaping Use    Vaping status: Never Used   Substance Use Topics    Alcohol use: Yes     Comment: sometimes 1-2 drinks in a week, but usually less    Drug use: Not Currently     Types: Marijuana     Comment: I would occasionally smoke recreationally in college             4/16/2025   STI Screening   New sexual partner(s) since last STI/HIV test? No     History of abnormal Pap smear: No - age 21-29 PAP every 3 years recommended        12/14/2021    10:05 AM   PAP / HPV   PAP Negative for Intraepithelial Lesion or Malignancy (NILM)            4/16/2025   Contraception/Family Planning   Questions about contraception or family planning No-birth control working well; she is not currently considering pregnancy but is open to it in the future     {Provider  REQUIRED FOR [facility] Use the storyboard to review patient history, after sections have been marked as reviewed, refresh note to capture documentation:[id number]}   Reviewed and updated as needed this visit by Provider   Tobacco     Med Hx  Surg Hx  Fam Hx  Soc Hx Sexual Activity          Past Medical History:   Diagnosis Date    Known health problems: none      Past Surgical History:   Procedure Laterality Date    wisdom teeth           Review of Systems  Constitutional, neuro, ENT, endocrine, pulmonary, cardiac, gastrointestinal, genitourinary,  "musculoskeletal, integument and psychiatric systems are negative, except as otherwise noted.     Objective    Exam  /71   Pulse 58   Temp 97.7  F (36.5  C) (Temporal)   Resp 10   Ht 1.722 m (5' 7.8\")   Wt 83 kg (183 lb)   LMP 03/23/2025 (Within Days)   SpO2 99%   BMI 27.99 kg/m     Estimated body mass index is 27.99 kg/m  as calculated from the following:    Height as of this encounter: 1.722 m (5' 7.8\").    Weight as of this encounter: 83 kg (183 lb).    Physical Exam  GENERAL: alert and no distress  EYES: Eyes grossly normal to inspection, PERRL and conjunctivae and sclerae normal  HENT: ear canals and TM's normal, nose and mouth without ulcers or lesions  NECK: no adenopathy, no asymmetry, masses, or scars  RESP: lungs clear to auscultation - no rales, rhonchi or wheezes  BREAST: normal without masses, tenderness or nipple discharge and no palpable axillary masses or adenopathy  CV: regular rate and rhythm, normal S1 S2, no S3 or S4, no murmur, click or rub, no peripheral edema  ABDOMEN: soft, nontender, no hepatosplenomegaly, no masses and bowel sounds normal   (female) w/bimanual: normal female external genitalia, normal urethral meatus, normal vaginal mucosa, normal cervix/adnexa/uterus without masses or discharge  MS: no gross musculoskeletal defects noted, no edema  SKIN: no suspicious lesions or rashes        Signed Electronically by: KORINA Bentley CNP  {Email feedback regarding this note to [email or web address]   :[id number]}  "

## 2025-04-18 LAB
ALBUMIN SERPL BCG-MCNC: 4.4 G/DL (ref 3.5–5.2)
ALP SERPL-CCNC: 51 U/L (ref 40–150)
ALT SERPL W P-5'-P-CCNC: 13 U/L (ref 0–50)
ANION GAP SERPL CALCULATED.3IONS-SCNC: 12 MMOL/L (ref 7–15)
AST SERPL W P-5'-P-CCNC: 20 U/L (ref 0–45)
BILIRUB SERPL-MCNC: <0.2 MG/DL
BUN SERPL-MCNC: 10.5 MG/DL (ref 6–20)
C TRACH DNA SPEC QL PROBE+SIG AMP: NEGATIVE
CALCIUM SERPL-MCNC: 9.3 MG/DL (ref 8.8–10.4)
CHLORIDE SERPL-SCNC: 103 MMOL/L (ref 98–107)
CREAT SERPL-MCNC: 0.8 MG/DL (ref 0.51–0.95)
EGFRCR SERPLBLD CKD-EPI 2021: >90 ML/MIN/1.73M2
GLUCOSE SERPL-MCNC: 97 MG/DL (ref 70–99)
HCO3 SERPL-SCNC: 22 MMOL/L (ref 22–29)
HIV 1+2 AB+HIV1 P24 AG SERPL QL IA: NONREACTIVE
N GONORRHOEA DNA SPEC QL NAA+PROBE: NEGATIVE
POTASSIUM SERPL-SCNC: 4 MMOL/L (ref 3.4–5.3)
PROT SERPL-MCNC: 7.2 G/DL (ref 6.4–8.3)
SODIUM SERPL-SCNC: 137 MMOL/L (ref 135–145)
SPECIMEN TYPE: NORMAL
T PALLIDUM AB SER QL: NONREACTIVE

## 2025-04-21 DIAGNOSIS — B37.31 YEAST INFECTION OF THE VAGINA: Primary | ICD-10-CM

## 2025-04-21 RX ORDER — FLUCONAZOLE 150 MG/1
150 TABLET ORAL ONCE
Qty: 1 TABLET | Refills: 0 | Status: SHIPPED | OUTPATIENT
Start: 2025-04-21 | End: 2025-04-21
